# Patient Record
Sex: MALE | Race: WHITE
[De-identification: names, ages, dates, MRNs, and addresses within clinical notes are randomized per-mention and may not be internally consistent; named-entity substitution may affect disease eponyms.]

---

## 2019-04-13 ENCOUNTER — HOSPITAL ENCOUNTER (EMERGENCY)
Dept: HOSPITAL 36 - ER | Age: 59
LOS: 1 days | Discharge: ANOTHER HOSPITAL | End: 2019-04-14
Payer: MEDICAID

## 2019-04-13 DIAGNOSIS — K56.609: Primary | ICD-10-CM

## 2019-04-13 LAB
ALBUMIN SERPL-MCNC: 4.8 GM/DL (ref 4.2–5.5)
ALBUMIN/GLOB SERPL: 1.6 {RATIO} (ref 1–1.8)
ALP SERPL-CCNC: 71 U/L (ref 34–104)
ALT SERPL-CCNC: 26 U/L (ref 7–52)
ANION GAP SERPL CALC-SCNC: 14.2 MMOL/L (ref 7–16)
APPEARANCE UR: CLEAR
AST SERPL-CCNC: 26 U/L (ref 13–39)
BACTERIA #/AREA URNS HPF: (no result) /HPF
BASOPHILS # BLD AUTO: 0.2 TH/CUMM (ref 0–0.2)
BASOPHILS NFR BLD AUTO: 2.2 % (ref 0–2)
BILIRUB SERPL-MCNC: 0.5 MG/DL (ref 0.3–1)
BILIRUB UR-MCNC: NEGATIVE MG/DL
BUN SERPL-MCNC: 14 MG/DL (ref 7–25)
CALCIUM SERPL-MCNC: 10.3 MG/DL (ref 8.6–10.3)
CHLORIDE SERPL-SCNC: 104 MEQ/L (ref 98–107)
CO2 SERPL-SCNC: 24.7 MEQ/L (ref 21–31)
COLOR UR: YELLOW
CREAT SERPL-MCNC: 0.9 MG/DL (ref 0.7–1.3)
EOSINOPHIL # BLD AUTO: 0.1 TH/CMM (ref 0.1–0.4)
EOSINOPHIL NFR BLD AUTO: 1.1 % (ref 0–5)
EPI CELLS URNS QL MICRO: (no result) /LPF
ERYTHROCYTE [DISTWIDTH] IN BLOOD BY AUTOMATED COUNT: 12.7 % (ref 11.5–20)
GLOBULIN SER-MCNC: 3.1 GM/DL
GLUCOSE SERPL-MCNC: 118 MG/DL (ref 70–105)
GLUCOSE UR STRIP-MCNC: NEGATIVE MG/DL
HCT VFR BLD CALC: 49.6 % (ref 41–60)
HGB BLD-MCNC: 16.2 GM/DL (ref 12–16)
INR PPP: 1.08 (ref 0.5–1.4)
KETONES UR STRIP-MCNC: NEGATIVE MG/DL
LEUKOCYTE ESTERASE UR-ACNC: NEGATIVE
LYMPHOCYTE AB SER FC-ACNC: 1.7 TH/CMM (ref 1.5–3)
LYMPHOCYTES NFR BLD AUTO: 15.8 % (ref 20–50)
MCH RBC QN AUTO: 28.2 PG (ref 26–30)
MCHC RBC AUTO-ENTMCNC: 32.7 PG (ref 28–36)
MCV RBC AUTO: 86.5 FL (ref 80–99)
MICRO URNS: YES
MONOCYTES # BLD AUTO: 0.3 TH/CMM (ref 0.3–1)
MONOCYTES NFR BLD AUTO: 3.3 % (ref 2–10)
NEUTROPHILS # BLD: 8.2 TH/CMM (ref 1.8–8)
NEUTROPHILS NFR BLD AUTO: 77.6 % (ref 40–80)
NITRITE UR QL STRIP: NEGATIVE
PH UR STRIP: 6 [PH] (ref 4.6–8)
PLATELET # BLD: 250 TH/CMM (ref 150–400)
PMV BLD AUTO: 7.3 FL
POTASSIUM SERPL-SCNC: 3.9 MEQ/L (ref 3.5–5.1)
PROT UR STRIP-MCNC: (no result) MG/DL
PROTHROMBIN TIME: 11.2 SECONDS (ref 9.5–11.5)
RBC # BLD AUTO: 5.74 MIL/CMM (ref 4.3–5.7)
RBC # UR STRIP: NEGATIVE /UL
RBC #/AREA URNS HPF: (no result) /HPF (ref 0–5)
SODIUM SERPL-SCNC: 139 MEQ/L (ref 136–145)
SP GR UR STRIP: >= 1.03 (ref 1–1.03)
URINALYSIS COMPLETE PNL UR: (no result)
UROBILINOGEN UR STRIP-ACNC: 0.2 E.U./DL (ref 0.2–1)
WBC # BLD AUTO: 10.5 TH/CMM (ref 4.8–10.8)
WBC #/AREA URNS HPF: (no result) /HPF (ref 0–5)

## 2019-04-13 NOTE — ED PHYSICIAN CHART
ED Chief Complaint/HPI





- Patient Information


Date Seen:: 04/06/19


Time Seen:: 22:54


Chief Complaint:: severe abdominal pain


History of Present Illness:: 





this is a 57 yo male with the sudden onset of generalize abdominal pain, 

vomiting with dizziness.  he denies fever and cough but was currently taking 

cipro 500mg po for bronchitis.


Allergies:: 


 Allergies











Allergy/AdvReac Type Severity Reaction Status Date / Time


 


No Known Allergies Allergy   Verified 05/18/18 01:16











Vitals:: 


 Vital Signs - 8 hr











  04/13/19





  22:43


 


Temp 98.5 F


 


HR 88


 


RR 17


 


/81


 


O2 Sat % 95











Historian:: Patient, Friend


Review:: Nurse's Note Reviewed





ED Review of Systems





- Review of Systems


General/Constitutional: No fever, No chills, No weight loss, No weakness, No 

diaphoresis, No edema, No loss of appetite


Skin: No skin lesions, No rash, No bruising


Head: No headache, No light-headedness


Eyes: No loss of vision, No pain, No diplopia


ENT: No earache, No nasal drainage, No sore throat, No tinnitus


Neck: No neck pain, No swelling, No thyromegaly, No stiffness, No mass noted


Cardio Vascular: No chest pain, No palpitations, No PND, No orthopnea, No edema


Pulmonary: No SOB, No cough, No sputum, No wheezing


GI: Nausea, Vomiting, No diarrhea, Pain, No melena, No hematochezia, No 

constipation, No hematemesis


G/U: No dysuria, No frequency, No hematuria


Musculoskeletal: No bone or joint pain, No back pain, No muscle pain


Endocrine: No polyuria, No polydipsia


Psychiatric: No prior psych history, No depression, No anxiety, No suicidal 

ideation


Hematopoietic: No bruising, No lymphadenopathy


Allergic/Immuno: No urticaria, No angioedema


Neurological: No syncope, No focal symptoms, No weakness, No paresthesia, No 

headache, No seizure, No dizziness, No confusion, No vertigo





ED Past Medical History





- Past Medical History


Obtainable: Yes


Past Medical History: No significant medical hx


Family History: None


Social History: Non Smoker, No Alcohol, No Drug Use, Employed


Surgical History: None


Psychiatricy History: None


Medication: Reviewed





Family Medical History





- Family Member


  ** Mother


History Unknown: Yes





  ** Brother


History Unknown: Yes


Hx Family Diabetes: Yes





ED Physical Exam





- Physical Examination


General/Constitutional: Awake, Well-developed, well-nourished, Alert, No 

distress, GCS 15, Non-toxic appearing, Ambulatory


Head: Atraumatic


Eyes: Lids, conjuctiva normal, PERRL, EOMI


Skin: Nl inspection, No rash, No skin lesions, No ecchymosis, Well hydrated, No 

lymphadenopathy


ENMT: External ears, nose nl, Nasal exam nl, Lips, teeth, gums nl


Neck: Nontender, Full ROM w/o pain, No JVD, No nuchal rigidity, No bruit, No 

mass, No stridor


Respiratory: Nl effort/Exclusion, Clear to Auscultation, No Wheeze/Rhonchi/Rales


Cardio Vascular: RRR, No murmur, gallop, rubs, NL S1 S2


GI: No tenderness/rebounding/guarding (tenderness of the periumbilical area 

with rebound), No organomegaly, No hernia, Normal BS's, Nondistended, No mass/

bruits, No McBurney tenderness


: No CVA tenderness


Extremities: No tenderness or effusion, Full ROM, normal strength in all 

extremities, No edema, Normal digits & nails


Neuro/Psych: Alert/oriented, DTR's symmetric, Normal sensory exam, Normal motor 

strength, Judgement/insight normal, Mood normal, Normal gait, No focal deficits


Misc: Normal back, No paraspinal tenderness





ED Labs/Radiology/EKG Results





- Lab Results


Results: 





 Abnormal Lab Results











  04/13/19 04/13/19 04/13/19





  22:40 23:00 23:00


 


WBC    10.5


 


RBC    5.74 H


 


Hgb    16.2


 


Hct    49.6


 


MCV    86.5


 


MCH    28.2


 


MCHC Differential    32.7


 


RDW    12.7


 


Plt Count    250


 


MPV    7.3


 


Neutrophils %    77.6


 


Lymphocytes %    15.8 L


 


Monocytes %    3.3


 


Eosinophils %    1.1


 


Basophils %    2.2 H


 


PT   11.2 


 


INR   1.08 


 


PTT (Actin FS)   24.2 L 


 


Sodium   


 


Potassium   


 


Chloride   


 


Carbon Dioxide   


 


Anion Gap   


 


BUN   


 


Creatinine   


 


Est GFR ( Amer)   


 


Est GFR (Non-Af Amer)   


 


BUN/Creatinine Ratio   


 


Glucose   


 


Calcium   


 


Total Bilirubin   


 


AST   


 


ALT   


 


Alkaline Phosphatase   


 


Troponin I   


 


Total Protein   


 


Albumin   


 


Globulin   


 


Albumin/Globulin Ratio   


 


Urine Source  CLEAN C  


 


Urine Color  YELLOW  


 


Urine Clarity  CLEAR  


 


Urine pH  6.0  


 


Ur Specific Gravity  >= 1.030  


 


Urine Protein  TRACE  


 


Urine Glucose (UA)  NEGATIVE  


 


Urine Ketones  NEGATIVE  


 


Urine Blood  NEGATIVE  


 


Urine Nitrate  NEGATIVE  


 


Urine Bilirubin  NEGATIVE  


 


Urine Urobilinogen  0.2  


 


Ur Leukocyte Esterase  NEGATIVE  


 


Urine RBC  NONE SEEN  


 


Urine WBC  0-2  


 


Ur Epithelial Cells  RARE  


 


Urine Bacteria  NONE SEEN  














  04/13/19 04/13/19





  23:00 23:00


 


WBC  


 


RBC  


 


Hgb  


 


Hct  


 


MCV  


 


MCH  


 


MCHC Differential  


 


RDW  


 


Plt Count  


 


MPV  


 


Neutrophils %  


 


Lymphocytes %  


 


Monocytes %  


 


Eosinophils %  


 


Basophils %  


 


PT  


 


INR  


 


PTT (Actin FS)  


 


Sodium  139 


 


Potassium  3.9 


 


Chloride  104 


 


Carbon Dioxide  24.7 


 


Anion Gap  14.2 


 


BUN  14 


 


Creatinine  0.9 


 


Est GFR ( Amer)  > 60.0 


 


Est GFR (Non-Af Amer)  > 60.0 


 


BUN/Creatinine Ratio  15.6 


 


Glucose  118 H 


 


Calcium  10.3 


 


Total Bilirubin  0.5 


 


AST  26 


 


ALT  26 


 


Alkaline Phosphatase  71 


 


Troponin I   < 0.01 L


 


Total Protein  7.9 


 


Albumin  4.8 


 


Globulin  3.1 


 


Albumin/Globulin Ratio  1.6 


 


Urine Source  


 


Urine Color  


 


Urine Clarity  


 


Urine pH  


 


Ur Specific Gravity  


 


Urine Protein  


 


Urine Glucose (UA)  


 


Urine Ketones  


 


Urine Blood  


 


Urine Nitrate  


 


Urine Bilirubin  


 


Urine Urobilinogen  


 


Ur Leukocyte Esterase  


 


Urine RBC  


 


Urine WBC  


 


Ur Epithelial Cells  


 


Urine Bacteria  














- Radiology Results


Results: 





ct scan of the abdomen = small bowel obstruction





ED Assessment





- Assessment


General Assessment: 





acute abdomen





ED Septic Shock





- .


Is Septic Shock (SBP<90, OR Lactate>4 mmol\L) present?: No





- <6hrs of presentation:


Vital Signs: 


 Vital Signs - 8 hr











  04/13/19





  22:43


 


Temp 98.5 F


 


HR 88


 


RR 17


 


/81


 


O2 Sat % 95














ED Reassessment (Disposition)





- Reassessment


Reassessment Condition:: Improved





- Diagnosis


Diagnosis:: 





small bowel obstruction





- Patient Disposition


Discharge/Transfer:: Acute Care w/in this hosp

## 2019-04-14 NOTE — DIAGNOSTIC IMAGING REPORT
Exam: CT examination abdomen pelvis



HISTORY: Pain



Total DLP equals 551



CTDI equals 10.6



Findings:



Multiple contiguous thin section of the abdomen pelvis obtained from

lower thorax to pubic symphysis without the administration of oral

intravenous contrast material, no prior studies available comparison.



The study demonstrates normal aeration of lung parenchyma the bases.



The liver parenchyma and spleen are normal.  The stomach significantly

distended with food content and air.  Gallbladder is distended. 

Pancreas intact.  The kidneys demonstrate no evidence of obstructive

uropathy or nephrolithiasis.



There is evidence of small bowel obstruction with a transition point in

the left lower quadrant.  Large amount of fecal content in the right

colon appreciated.  There is evidence of diverticular disease without

diverticulitis.  Right inguinal hernia with fat content.



The appendix is intact.  The urinary bladder is contracted.  Prostate

gland is prominent.  Bony structures demonstrate no evidence for lytic

or blastic changes.



IMPRESSION:



 Small bowel obstruction.





Diverticular disease of the colon.